# Patient Record
Sex: MALE | Race: BLACK OR AFRICAN AMERICAN | NOT HISPANIC OR LATINO | URBAN - METROPOLITAN AREA
[De-identification: names, ages, dates, MRNs, and addresses within clinical notes are randomized per-mention and may not be internally consistent; named-entity substitution may affect disease eponyms.]

---

## 2018-08-16 ENCOUNTER — EMERGENCY (EMERGENCY)
Facility: HOSPITAL | Age: 43
LOS: 1 days | Discharge: HOME | End: 2018-08-16
Attending: EMERGENCY MEDICINE | Admitting: EMERGENCY MEDICINE
Payer: MEDICAID

## 2018-08-16 VITALS
RESPIRATION RATE: 16 BRPM | TEMPERATURE: 98 F | OXYGEN SATURATION: 97 % | DIASTOLIC BLOOD PRESSURE: 69 MMHG | HEART RATE: 80 BPM | SYSTOLIC BLOOD PRESSURE: 105 MMHG

## 2018-08-16 VITALS
SYSTOLIC BLOOD PRESSURE: 104 MMHG | RESPIRATION RATE: 16 BRPM | OXYGEN SATURATION: 98 % | HEART RATE: 70 BPM | DIASTOLIC BLOOD PRESSURE: 76 MMHG

## 2018-08-16 PROCEDURE — 71101 X-RAY EXAM UNILAT RIBS/CHEST: CPT | Mod: 26,LT

## 2018-08-16 PROCEDURE — 99283 EMERGENCY DEPT VISIT LOW MDM: CPT

## 2018-08-16 PROCEDURE — 73030 X-RAY EXAM OF SHOULDER: CPT | Mod: 26,LT

## 2018-08-16 PROCEDURE — 73660 X-RAY EXAM OF TOE(S): CPT | Mod: 26,LT

## 2018-08-16 RX ORDER — LIDOCAINE 4 G/100G
1 CREAM TOPICAL ONCE
Qty: 0 | Refills: 0 | Status: COMPLETED | OUTPATIENT
Start: 2018-08-16 | End: 2018-08-16

## 2018-08-16 RX ORDER — ACETAMINOPHEN 500 MG
1 TABLET ORAL
Qty: 50 | Refills: 0 | OUTPATIENT
Start: 2018-08-16

## 2018-08-16 RX ORDER — ACETAMINOPHEN 500 MG
975 TABLET ORAL ONCE
Qty: 0 | Refills: 0 | Status: DISCONTINUED | OUTPATIENT
Start: 2018-08-16 | End: 2018-08-16

## 2018-08-16 RX ORDER — ACETAMINOPHEN 500 MG
650 TABLET ORAL ONCE
Qty: 0 | Refills: 0 | Status: COMPLETED | OUTPATIENT
Start: 2018-08-16 | End: 2018-08-16

## 2018-08-16 RX ORDER — LIDOCAINE 4 G/100G
1 CREAM TOPICAL
Qty: 15 | Refills: 0 | OUTPATIENT
Start: 2018-08-16

## 2018-08-16 RX ADMIN — Medication 650 MILLIGRAM(S): at 21:03

## 2018-08-16 RX ADMIN — LIDOCAINE 1 PATCH: 4 CREAM TOPICAL at 20:14

## 2018-08-16 NOTE — ED PROVIDER NOTE - CARE PLAN
Principal Discharge DX:	Rib pain on left side  Assessment and plan of treatment:	1. You were seen for rib pain. A copy of your resulted labs, imaging, and findings have been provided to you.  2. Continue to take your home medications as prescribed.  3. Follow up with [facility] AND your primary care doctor within 48 hours. Please call [phone] to make an appointment or with any questions you may have.  4. Return immediately to the emergency department for new, persistent, or worsening symptoms or signs. Return immediately to the emergency department if you have chest pain, shortness of breath, loss of consciousness, fever, discoloration of skin, or difficulty breathing, or coughing up blood.

## 2018-08-16 NOTE — ED SUB INTERN NOTE - MEDICAL DECISION MAKING DETAILS
Due to nature of injury, likely costochondritis, but CXR and rib series to rule out fracture. Pain control.

## 2018-08-16 NOTE — ED PROVIDER NOTE - PLAN OF CARE
1. You were seen for rib pain. A copy of your resulted labs, imaging, and findings have been provided to you.  2. Continue to take your home medications as prescribed.  3. Follow up with [facility] AND your primary care doctor within 48 hours. Please call [phone] to make an appointment or with any questions you may have.  4. Return immediately to the emergency department for new, persistent, or worsening symptoms or signs. Return immediately to the emergency department if you have chest pain, shortness of breath, loss of consciousness, fever, discoloration of skin, or difficulty breathing, or coughing up blood.

## 2018-08-16 NOTE — ED PROVIDER NOTE - ATTENDING CONTRIBUTION TO CARE
I performed a face-to-face evaluation of the patient and performed a history and physical examination. I agree with the history and physical examination.    Georges: AIDS, [place] airport. No LOC or HA. (B) paraspinal neck pain. FROM. No new neuro deficits. Pain L shoulder, ribs, little toe. Pain control, xrays.

## 2018-08-16 NOTE — ED PROVIDER NOTE - PROGRESS NOTE DETAILS
Georges: No pain proximal phalanx L 3rd toe, even w/ deep palpation. Xray w/ possible fx there. Will put in post-op shoe. Podiatry f/u.

## 2018-08-16 NOTE — ED PROVIDER NOTE - MEDICAL DECISION MAKING DETAILS
Georges: AIDS, fell [place] wheelchair at airport. No LOC or HA. (B) paraspinal neck pain. FROM. No new neuro deficits. Pain L shoulder, ribs, little toe. Pain control, xrays.

## 2018-08-16 NOTE — ED ADULT TRIAGE NOTE - CHIEF COMPLAINT QUOTE
Pt fell x1 week ago and pt was dropped being transported from  into .  No head trauma or LOC.  C/O left rib, left arm, and neck pain.  PMH CVA seizures COPD dementia aneurysm HIV

## 2018-08-16 NOTE — ED SUB INTERN NOTE - MUSCULOSKELETAL, MLM
Mild tenderness on palpation of left ribs and clavicle. No crepitus or erythema. Spine appears normal, range of motion is not limited.

## 2018-08-16 NOTE — ED PROVIDER NOTE - PMH
AIDS    CVA (cerebral vascular accident)    Dementia    HIV (human immunodeficiency virus infection)

## 2018-08-16 NOTE — ED SUB INTERN NOTE - SKIN NEGATIVE STATEMENT, MLM
Old abrasion from a fall 1 week ago on left chest. No jaundice, no lesions, no pruritis, and no rashes.

## 2018-08-16 NOTE — ED SUB INTERN NOTE - OBJECTIVE STATEMENT FT
Mr. Yao is a 42 yo male with hx of AIDS dementia, epilepsy, past stroke with left sided weakness, non-ambulatory presenting with left sided rib and clavicular pain. He presents with his mother who describes the history as he is non-verbal. He was boarding a plane in [place] with the help of the airline staff who were moving him between wheelchairs when he slipped and fell back into the chair hitting the left side of his chest. Since then has been complaining of pain on the left ribs and clavicle. Denies head trauma or LOC. Denies SOB, headache, abdominal pain.

## 2018-08-16 NOTE — ED PROVIDER NOTE - OBJECTIVE STATEMENT
42 yo M c PMH of AIDS, dementia, CVA with L-sided residual weakness, recurrent seizures p/w 1 day h/o L anterior chest and L clavicular pain s/p airport workers lifting pt up and he fell back onto the chair onto his L side. Pt non-ambulatory at baseline. Pt was at [facility] for a fall last week, has R wrist splint and residual bruise on L anterior chest.     ROS positive: L anterior chest pain, L clavicular pain  ROS negative: f/c, SOB, abdominal pain, n/v

## 2018-08-16 NOTE — ED ADULT NURSE NOTE - NSIMPLEMENTINTERV_GEN_ALL_ED
Implemented All Fall with Harm Risk Interventions:  [place] to call system. Call bell, personal items and telephone within reach. Instruct patient to call for assistance. Room bathroom lighting operational. Non-slip footwear when patient is off stretcher. Physically safe environment: no spills, clutter or unnecessary equipment. Stretcher in lowest position, wheels locked, appropriate side rails in place. Provide visual cue, wrist band, yellow gown, etc. Monitor gait and stability. Monitor for mental status changes and reorient to person, place, and time. Review medications for side effects contributing to fall risk. Reinforce activity limits and safety measures with patient and family. Provide visual clues: red socks.

## 2022-12-15 NOTE — ED SUB INTERN NOTE - CROS ED SKIN ALL NEG
negative... Cellcept Pregnancy And Lactation Text: This medication is Pregnancy Category D and isn't considered safe during pregnancy. It is unknown if this medication is excreted in breast milk.

## 2023-10-30 NOTE — ED SUB INTERN NOTE - GASTROINTESTINAL NEGATIVE STATEMENT, MLM
10/30/23 Called and spoke with patient that rx has been sent    no abdominal pain, no bloating, no constipation, no diarrhea, no nausea and no vomiting.